# Patient Record
Sex: MALE
[De-identification: names, ages, dates, MRNs, and addresses within clinical notes are randomized per-mention and may not be internally consistent; named-entity substitution may affect disease eponyms.]

---

## 2022-12-13 PROBLEM — Z00.00 ENCOUNTER FOR PREVENTIVE HEALTH EXAMINATION: Status: ACTIVE | Noted: 2022-12-13

## 2023-02-06 ENCOUNTER — APPOINTMENT (OUTPATIENT)
Dept: TRANSPLANT | Facility: CLINIC | Age: 63
End: 2023-02-06

## 2023-02-06 ENCOUNTER — APPOINTMENT (OUTPATIENT)
Dept: NEPHROLOGY | Facility: CLINIC | Age: 63
End: 2023-02-06
Payer: COMMERCIAL

## 2023-02-06 VITALS — WEIGHT: 315 LBS | TEMPERATURE: 98 F | RESPIRATION RATE: 15 BRPM

## 2023-02-06 DIAGNOSIS — Z01.818 ENCOUNTER FOR OTHER PREPROCEDURAL EXAMINATION: ICD-10-CM

## 2023-02-06 PROCEDURE — 99072 ADDL SUPL MATRL&STAF TM PHE: CPT

## 2023-02-06 PROCEDURE — 99204 OFFICE O/P NEW MOD 45 MIN: CPT

## 2023-02-08 PROBLEM — Z01.818 PRE-TRANSPLANT EVALUATION FOR ESRD (END STAGE RENAL DISEASE): Status: ACTIVE | Noted: 2023-02-08

## 2023-02-08 NOTE — ASSESSMENT
[Not a candidate] : not a candidate. We should not proceed with our protocol for evaluation for kidney transplantation. [FreeTextEntry1] : He is 375 lbs, height is 5'10"- BMI over 50 \par \par He started a diabetic medication - Mounjaro ( Tirzepatide) which is useful in weight loss. \par Needs nutrition consult. Recommended bariatric surgery consult ( he is slightly hesitant , but will consider) \par will reevaluate once BMI is < 40

## 2023-02-08 NOTE — HISTORY OF PRESENT ILLNESS
[TextBox_42] : Joe Fountain is a 62 year old male who presents for kidney transplant evaluation. \par Cause of ESRD : likely long standing  DM . no biopsy done\par Nephrologist: Dr Pallavan\par [facility] since Jan 2022\par  \par Other PMH :\par Cardiac -has HTN, HLD. No h/o MI , CHF, CAD\par Pulmonary-  no h/o COPD, Asthma. 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 no Thyroid disease\par Neuro- no h/o CVA or seizures \par Psych-  no h/o suicidal ideation, depression or anxiety. 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 Has bad knee pain due to arthritis and uses a waker at home.  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 .  Is a non smoker, no alcohol or illicit use.\par Family history:  no family h/o kidney disease or malignancy. \par \par \par \par